# Patient Record
Sex: FEMALE | Race: WHITE | NOT HISPANIC OR LATINO | URBAN - METROPOLITAN AREA
[De-identification: names, ages, dates, MRNs, and addresses within clinical notes are randomized per-mention and may not be internally consistent; named-entity substitution may affect disease eponyms.]

---

## 2022-08-12 ENCOUNTER — ANNUAL EXAM (OUTPATIENT)
Dept: OBGYN CLINIC | Facility: CLINIC | Age: 22
End: 2022-08-12
Payer: COMMERCIAL

## 2022-08-12 VITALS
WEIGHT: 184.2 LBS | HEIGHT: 65 IN | DIASTOLIC BLOOD PRESSURE: 68 MMHG | BODY MASS INDEX: 30.69 KG/M2 | SYSTOLIC BLOOD PRESSURE: 108 MMHG

## 2022-08-12 DIAGNOSIS — Z11.3 SCREEN FOR STD (SEXUALLY TRANSMITTED DISEASE): ICD-10-CM

## 2022-08-12 DIAGNOSIS — E55.9 VITAMIN D DEFICIENCY: ICD-10-CM

## 2022-08-12 DIAGNOSIS — Z01.419 ENCOUNTER FOR GYNECOLOGICAL EXAMINATION WITHOUT ABNORMAL FINDING: Primary | ICD-10-CM

## 2022-08-12 DIAGNOSIS — Z30.09 CONTRACEPTIVE EDUCATION: ICD-10-CM

## 2022-08-12 DIAGNOSIS — R10.2 PELVIC PAIN: ICD-10-CM

## 2022-08-12 PROCEDURE — 0503F POSTPARTUM CARE VISIT: CPT | Performed by: OBSTETRICS & GYNECOLOGY

## 2022-08-12 PROCEDURE — 99395 PREV VISIT EST AGE 18-39: CPT | Performed by: OBSTETRICS & GYNECOLOGY

## 2022-08-12 RX ORDER — OMEPRAZOLE 20 MG/1
CAPSULE, DELAYED RELEASE ORAL
COMMUNITY
Start: 2022-06-16

## 2022-08-12 RX ORDER — MULTIVIT-MIN/IRON/FOLIC ACID/K 18-600-40
CAPSULE ORAL
COMMUNITY

## 2022-08-12 NOTE — PROGRESS NOTES
[address], [address], [place] [address]    ASSESSMENT/PLAN: Monica Prado is a 20 y o  No obstetric history on file  who presents for annual gynecologic exam     Encounter for routine gynecologic examination  - Routine well woman exam completed today  - HPV Vaccination status: Immunization series complete  - STI screening offered including HIV testing: culture done   - Contraceptive counseling discussed  Current contraception: condoms:     Additional problems addressed during this visit:  1  Encounter for gynecological examination without abnormal finding    2  Contraceptive education    3  Pelvic pain  -     US pelvis complete w transvaginal; Future; Expected date: 2022    4  Vitamin D deficiency    5  Screen for STD (sexually transmitted disease)  -     Chlamydia/N  gonorrhoeae RNA, TMA        CC:  Annual Gynecologic Examination    HPI: Monica Prado is a 20 y o  No obstetric history on file  who presents for annual gynecologic examination  19 yo  here for wellness exam   Neg  Cytology in     On Orthotricyclen  By primary for  HMB in past   No surgical hx    Hx of GERD, HMB  Menarche at  14years of age  35-28 d,  5-7 d,  Previously chg   Tampon every 1-2 hours  On   OCP onset age 22  The following portions of the patient's history were reviewed and updated as appropriate: She  has a past medical history of Kidney stone and Papanicolaou smear (2020)  She  has no past surgical history on file  Her family history includes Deep vein thrombosis in her father; Diabetes in her paternal grandmother; Heart disease in her paternal grandmother; Lupus in her father; Pulmonary embolism in her father  She  reports that she has never smoked  She has never used smokeless tobacco  She reports previous alcohol use  She reports that she does not use drugs    Current Outpatient Medications   Medication Sig Dispense Refill    Cholecalciferol (Vitamin D) 50 MCG (2000 UT) CAPS Take by mouth      omeprazole (PriLOSEC) 20 mg delayed release capsule TAKE 1 CAPSULE BY MOUTH EVERY DAY 30 MINUTES BEFORE MORNING MEAL FOR 90 DAYS       No current facility-administered medications for this visit  She is allergic to amoxicillin-pot clavulanate and clindamycin       Review of Systems   Constitutional: Negative for chills and fever  HENT: Negative for ear pain and sore throat  Eyes: Negative for pain and visual disturbance  Respiratory: Negative for cough and shortness of breath  Cardiovascular: Negative for chest pain and palpitations  Gastrointestinal: Negative for abdominal pain and vomiting  Genitourinary: Negative for dysuria and hematuria  Musculoskeletal: Negative for arthralgias and back pain  Skin: Negative for color change and rash  Neurological: Negative for seizures and syncope  All other systems reviewed and are negative  Objective:  /68 (BP Location: Left arm, Patient Position: Sitting, Cuff Size: Standard)   Ht 5' 5" (1 651 m)   Wt 83 6 kg (184 lb 3 2 oz)   LMP 08/06/2022   BMI 30 65 kg/m²    Physical Exam  Vitals and nursing note reviewed  Constitutional:       Appearance: Normal appearance  HENT:      Head: Normocephalic  Cardiovascular:      Rate and Rhythm: Normal rate and regular rhythm  Pulses: Normal pulses  Heart sounds: Normal heart sounds  Pulmonary:      Effort: Pulmonary effort is normal       Breath sounds: Normal breath sounds  Chest:      Chest wall: No mass, lacerations, swelling, tenderness or edema  Breasts: Keith Score is 4  Breasts are symmetrical       Right: Normal  No swelling, bleeding, inverted nipple, mass, nipple discharge, skin change, tenderness, axillary adenopathy or supraclavicular adenopathy  Left: No swelling, bleeding, inverted nipple, mass, nipple discharge, skin change, tenderness, axillary adenopathy or supraclavicular adenopathy  Abdominal:      General: Abdomen is flat  Bowel sounds are normal       Palpations: Abdomen is soft  Genitourinary:     General: Normal vulva  Exam position: Lithotomy position  Pubic Area: No rash  Keith stage (genital): 4       Labia:         Right: No rash, tenderness or lesion  Left: No rash, tenderness or lesion  Urethra: No urethral pain, urethral swelling or urethral lesion  Vagina: Normal       Cervix: No cervical motion tenderness or discharge  Uterus: Normal        Adnexa: Right adnexa normal and left adnexa normal       Rectum: Normal    Musculoskeletal:         General: Normal range of motion  Cervical back: Neck supple  Lymphadenopathy:      Upper Body:      Right upper body: No supraclavicular, axillary or pectoral adenopathy  Left upper body: No supraclavicular, axillary or pectoral adenopathy  Lower Body: No right inguinal adenopathy  No left inguinal adenopathy  Skin:     General: Skin is warm and dry  Neurological:      General: No focal deficit present  Mental Status: She is alert and oriented to person, place, and time     Psychiatric:         Mood and Affect: Mood normal          Behavior: Behavior normal

## 2022-08-12 NOTE — PATIENT INSTRUCTIONS
Pap every 3 years if normal, STI testing as indicated, exercise most days of week, obtain appropriate diet and hydration, Calcium 1000mg + 600 vit D daily, birth control as directed (ACHES reviewed)  Benefits, risks and alternatives discussed/reviewed  Condom use when sexually active for sexually transmitted infection prevention  HPV 9 vaccine recommended through age 39  Check with your insurance for coverage  If covered, call office to schedule start of vaccine series  Annual mammogram starting at age 36, monthly breast self exam  Terrell Rosales   at red light or at least  20 times a day

## 2022-08-13 LAB
C TRACH RRNA SPEC QL NAA+PROBE: NOT DETECTED
N GONORRHOEA RRNA SPEC QL NAA+PROBE: NOT DETECTED

## 2024-05-03 ENCOUNTER — ANNUAL EXAM (OUTPATIENT)
Dept: OBGYN CLINIC | Facility: CLINIC | Age: 24
End: 2024-05-03
Payer: COMMERCIAL

## 2024-05-03 VITALS
DIASTOLIC BLOOD PRESSURE: 72 MMHG | SYSTOLIC BLOOD PRESSURE: 114 MMHG | HEIGHT: 65 IN | WEIGHT: 192 LBS | BODY MASS INDEX: 31.99 KG/M2

## 2024-05-03 DIAGNOSIS — E03.9 HYPOTHYROIDISM, UNSPECIFIED TYPE: ICD-10-CM

## 2024-05-03 DIAGNOSIS — N62 BREAST HYPERTROPHY IN FEMALE: ICD-10-CM

## 2024-05-03 DIAGNOSIS — Z30.09 CONTRACEPTIVE EDUCATION: ICD-10-CM

## 2024-05-03 DIAGNOSIS — Z01.411 ENCOUNTER FOR GYNECOLOGICAL EXAMINATION WITH ABNORMAL FINDING: Primary | ICD-10-CM

## 2024-05-03 DIAGNOSIS — Z12.4 SCREENING FOR CERVICAL CANCER: ICD-10-CM

## 2024-05-03 DIAGNOSIS — N94.6 DYSMENORRHEA: ICD-10-CM

## 2024-05-03 PROCEDURE — 99395 PREV VISIT EST AGE 18-39: CPT | Performed by: OBSTETRICS & GYNECOLOGY

## 2024-05-03 RX ORDER — SPIRONOLACTONE 25 MG/1
25 TABLET ORAL DAILY
COMMUNITY

## 2024-05-03 RX ORDER — DROSPIRENONE AND ETHINYL ESTRADIOL 0.02-3(28)
1 KIT ORAL DAILY
Qty: 90 TABLET | Refills: 4 | Status: SHIPPED | OUTPATIENT
Start: 2024-05-03

## 2024-05-03 RX ORDER — SPIRONOLACTONE 50 MG/1
50 TABLET, FILM COATED ORAL DAILY
COMMUNITY

## 2024-05-03 NOTE — PROGRESS NOTES
[facility] [address], [address], [place] [address]    ASSESSMENT/PLAN: Serenity Rodriguez is a 24 y.o.  who presents for annual gynecologic exam.    Encounter for routine gynecologic examination  - Routine well woman exam completed today.  - HPV Vaccination status: Immunization series complete  - STI screening offered including HIV testing: Declined  - Contraceptive counseling discussed.  Current contraception: condoms or combination OCPs:     Additional problems addressed during this visit:  1. Encounter for gynecological examination with abnormal finding    2. Screening for cervical cancer  -     Thinprep Tis Pap Reflex HPV mRNA E6/E7    3. Contraceptive education  Comments:  CONDOMS, START PILL DAY ONE OF MENSES ONE TABLET DAILY  Orders:  -     drospirenone-ethinyl estradiol (MATTHEW) 3-0.02 MG per tablet; Take 1 tablet by mouth daily    4. Breast hypertrophy in female  Comments:  referral to gwendolyn mckay    neck pain , back pain , dovots,   rashes.   has to  wear double  bra to exercise. 38 DD    5. Hypothyroidism, unspecified type  Comments:  HAS CONSULT  2025    6. Dysmenorrhea  Comments:  MOTIRN 600 MG EVERY 6  HOURS WHILE AWAKE  Orders:  -     drospirenone-ethinyl estradiol (MATTHEW) 3-0.02 MG per tablet; Take 1 tablet by mouth daily        CC:  Annual Gynecologic Examination    HPI: Serenity Rodriguez is a 24 y.o.  who presents for annual gynecologic examination.  25 yo  here for wellness exam.  Not currently sexually active.  Cycles every 28 to 30 days heavy with a lot of cramping back pain PMS type symptoms.  Positive Gardasil.  Last menstrual period 2024.  Positive recent dry skin, weight gain, fatigue.  Diagnosed with hypothyroidism awaiting endocrine consult.  Positive breast hypertrophy.  38 double D bra size.  Patient reports that she has neck pain, back pain, rashes under her breasts, and difficulty finding sports where has to wear 2 bras to exercise.   Discussed with patient breast reduction referral to Devonte Salguero.  Patient has used nonsteroidals for her pain.  Positive condom use if sexually active.      The following portions of the patient's history were reviewed and updated as appropriate: She  has a past medical history of Hashimoto's disease, Kidney stone, and Papanicolaou smear (09/23/2020).  She  has no past surgical history on file.  Her family history includes Deep vein thrombosis in her father; Diabetes in her maternal grandmother and paternal grandmother; Heart disease in her maternal grandmother and paternal grandmother; Lupus in her father; Pulmonary embolism in her father.  She  reports that she has never smoked. She has never used smokeless tobacco. She reports that she does not currently use alcohol. She reports that she does not use drugs.  Current Outpatient Medications   Medication Sig Dispense Refill   • Cholecalciferol (Vitamin D) 50 MCG (2000 UT) CAPS Take by mouth     • drospirenone-ethinyl estradiol (MATTHEW) 3-0.02 MG per tablet Take 1 tablet by mouth daily 90 tablet 4   • omeprazole (PriLOSEC) 20 mg delayed release capsule TAKE 1 CAPSULE BY MOUTH EVERY DAY 30 MINUTES BEFORE MORNING MEAL FOR 90 DAYS     • spironolactone (ALDACTONE) 25 mg tablet Take 25 mg by mouth daily     • spironolactone (ALDACTONE) 50 mg tablet Take 50 mg by mouth daily       No current facility-administered medications for this visit.     She is allergic to amoxicillin-pot clavulanate and clindamycin..    Review of Systems   Constitutional:  Positive for activity change, fatigue and unexpected weight change. Negative for chills and fever.   HENT:  Negative for ear pain and sore throat.    Eyes:  Negative for pain and visual disturbance.   Respiratory:  Negative for cough and shortness of breath.    Cardiovascular:  Negative for chest pain and palpitations.   Gastrointestinal:  Negative for abdominal distention, abdominal pain, constipation and vomiting.   Endocrine:  "Negative.         Wt  gain, dry skin  hmb    Genitourinary:  Negative for dysuria, hematuria, vaginal bleeding and vaginal discharge.   Musculoskeletal:  Negative for arthralgias and back pain.   Skin:  Negative for color change and rash.   Allergic/Immunologic: Negative.    Neurological: Negative.  Negative for seizures and syncope.   Hematological: Negative.    Psychiatric/Behavioral: Negative.     All other systems reviewed and are negative.        Objective:  /72 (BP Location: Right arm, Patient Position: Sitting, Cuff Size: Standard)   Ht 5' 5\" (1.651 m)   Wt 87.1 kg (192 lb)   LMP 04/20/2024   BMI 31.95 kg/m²    Physical Exam  Vitals and nursing note reviewed.   Constitutional:       Appearance: Normal appearance.   HENT:      Head: Normocephalic.   Cardiovascular:      Rate and Rhythm: Normal rate and regular rhythm.      Pulses: Normal pulses.      Heart sounds: Normal heart sounds.   Pulmonary:      Effort: Pulmonary effort is normal.      Breath sounds: Normal breath sounds.   Chest:      Chest wall: No mass, lacerations, swelling, tenderness or edema.   Breasts:     Keith Score is 4.      Breasts are symmetrical.      Right: No swelling, bleeding, inverted nipple, mass, nipple discharge, skin change or tenderness.      Left: No swelling, bleeding, inverted nipple, mass, nipple discharge, skin change or tenderness.          Comments: 1.  PENDULAR  SYMMETRICAL  NO MASSES       Abdominal:      General: Abdomen is flat. Bowel sounds are normal.      Palpations: Abdomen is soft.   Genitourinary:     General: Normal vulva.      Exam position: Lithotomy position.      Pubic Area: No rash.       Keith stage (genital): 4.      Labia:         Right: No rash, tenderness or lesion.         Left: No rash, tenderness or lesion.       Urethra: No urethral pain, urethral swelling or urethral lesion.      Vagina: Normal.      Cervix: No cervical motion tenderness or discharge.      Uterus: Normal.       " Adnexa: Right adnexa normal and left adnexa normal.      Rectum: Normal.   Musculoskeletal:         General: Normal range of motion.      Cervical back: Normal range of motion and neck supple.   Lymphadenopathy:      Upper Body:      Right upper body: No supraclavicular, axillary or pectoral adenopathy.      Left upper body: No supraclavicular, axillary or pectoral adenopathy.      Lower Body: No right inguinal adenopathy. No left inguinal adenopathy.   Skin:     General: Skin is warm and dry.   Neurological:      General: No focal deficit present.      Mental Status: She is alert and oriented to person, place, and time.   Psychiatric:         Mood and Affect: Mood normal.         Behavior: Behavior normal.         Thought Content: Thought content normal.         Judgment: Judgment normal.

## 2024-05-08 LAB
CLINICAL INFO: NORMAL
CYTO CVX: NORMAL
CYTOLOGY CMNT CVX/VAG CYTO-IMP: NORMAL
DATE PREVIOUS BX: NORMAL
LMP START DATE: NORMAL
SL AMB PREV. PAP:: NORMAL
SPECIMEN SOURCE CVX/VAG CYTO: NORMAL

## 2024-06-02 PROBLEM — Z12.4 SCREENING FOR CERVICAL CANCER: Status: RESOLVED | Noted: 2024-05-03 | Resolved: 2024-06-02

## 2024-08-04 ENCOUNTER — HOSPITAL ENCOUNTER (EMERGENCY)
Dept: HOSPITAL 99 - EMR | Age: 24
Discharge: HOME | End: 2024-08-04
Payer: COMMERCIAL

## 2024-08-04 VITALS — DIASTOLIC BLOOD PRESSURE: 88 MMHG | RESPIRATION RATE: 16 BRPM | SYSTOLIC BLOOD PRESSURE: 125 MMHG

## 2024-08-04 VITALS — RESPIRATION RATE: 16 BRPM | SYSTOLIC BLOOD PRESSURE: 156 MMHG | DIASTOLIC BLOOD PRESSURE: 98 MMHG

## 2024-08-04 VITALS — BODY MASS INDEX: 31.7 KG/M2

## 2024-08-04 DIAGNOSIS — M54.16: Primary | ICD-10-CM

## 2024-08-04 DIAGNOSIS — M62.838: ICD-10-CM

## 2024-08-04 PROCEDURE — 99284 EMERGENCY DEPT VISIT MOD MDM: CPT

## 2024-08-04 PROCEDURE — 96372 THER/PROPH/DIAG INJ SC/IM: CPT

## 2024-08-04 RX ADMIN — DIAZEPAM 5 MG: 5 INJECTION INTRAMUSCULAR; INTRAVENOUS at 18:36

## 2024-08-04 RX ADMIN — ACETAMINOPHEN 1000 MG: 500 TABLET ORAL at 18:35

## 2024-08-04 RX ADMIN — OXYCODONE HYDROCHLORIDE 5 MG: 5 TABLET ORAL at 20:14

## 2024-08-04 RX ADMIN — PREDNISONE 50 MG: 50 TABLET ORAL at 21:22

## 2024-08-06 ENCOUNTER — HOSPITAL ENCOUNTER (OUTPATIENT)
Dept: HOSPITAL 99 - CLAB | Age: 24
End: 2024-08-06
Payer: COMMERCIAL

## 2024-08-06 DIAGNOSIS — N62: Primary | ICD-10-CM

## 2024-08-06 PROCEDURE — 88305 TISSUE EXAM BY PATHOLOGIST: CPT

## 2024-08-12 ENCOUNTER — OFFICE VISIT (OUTPATIENT)
Dept: OBGYN CLINIC | Facility: CLINIC | Age: 24
End: 2024-08-12
Payer: COMMERCIAL

## 2024-08-12 VITALS
BODY MASS INDEX: 30.89 KG/M2 | SYSTOLIC BLOOD PRESSURE: 112 MMHG | HEIGHT: 65 IN | WEIGHT: 185.4 LBS | DIASTOLIC BLOOD PRESSURE: 74 MMHG

## 2024-08-12 DIAGNOSIS — Z30.09 CONTRACEPTIVE EDUCATION: Primary | ICD-10-CM

## 2024-08-12 DIAGNOSIS — Z30.41 SURVEILLANCE FOR BIRTH CONTROL, ORAL CONTRACEPTIVES: ICD-10-CM

## 2024-08-12 PROCEDURE — 99213 OFFICE O/P EST LOW 20 MIN: CPT | Performed by: OBSTETRICS & GYNECOLOGY

## 2024-08-12 RX ORDER — LEVOTHYROXINE SODIUM 125 UG/1
125 TABLET ORAL DAILY
COMMUNITY
Start: 2024-07-22

## 2024-08-12 NOTE — PATIENT INSTRUCTIONS
Take birth control as directed.   [place]  BTB  days  as explained.   Rx sent to pharmacy on file.  ACHES reviewed.   Aware of benefits, risks and alternatives of birth control.   Exercise 150 minutes per week minimum.   Always condom use for STI protection.

## 2024-08-12 NOTE — PROGRESS NOTES
PROBLEM GYNECOLOGICAL VISIT    Serenity Rodriguez is a 24 y.o. female who presents today fu from starting  Matthew.  Her general medical history has been reviewed and she reports it as follows:    Past Medical History:   Diagnosis Date   • Hashimoto's disease     Hypothyroid   • Kidney stone    • Papanicolaou smear 2020    Normal Pap     Past Surgical History:   Procedure Laterality Date   • COMBINED REDUCTION MAMMAPLASTY W/ LIPOSUCTION Bilateral      OB History        0    Para   0    Term   0       0    AB   0    Living   0       SAB   0    IAB   0    Ectopic   0    Multiple   0    Live Births   0           Obstetric Comments   13           Social History     Tobacco Use   • Smoking status: Never   • Smokeless tobacco: Never   • Tobacco comments:     Never used.   Vaping Use   • Vaping status: Never Used   Substance Use Topics   • Alcohol use: Not Currently     Comment: Rarely drink. Sometimes socially.   • Drug use: Never       Current Outpatient Medications   Medication Instructions   • Cholecalciferol (Vitamin D) 50 MCG (2000) CAPS Oral   • drospirenone-ethinyl estradiol (MATTHEW) 3-0.02 MG per tablet 1 tablet, Oral, Daily   • levothyroxine 125 mcg, Oral, Daily   • omeprazole (PriLOSEC) 20 mg delayed release capsule TAKE 1 CAPSULE BY MOUTH EVERY DAY 30 MINUTES BEFORE MORNING MEAL FOR 90 DAYS   • spironolactone (ALDACTONE) 50 mg, Oral, Daily       History of Present Illness:   Pt  w hx of acne   on  ocp   x 4 packs some  btb.  + started on levothyroxine   wt 7 lb wt loss .  Not sexually active.   + b/l breast reduction  last week     Review of Systems:  Review of Systems   Constitutional: Negative.  Negative for activity change, appetite change and unexpected weight change.   Gastrointestinal: Negative.    Endocrine: Negative.    Genitourinary: Negative.         Imb pack 2   Allergic/Immunologic: Negative.    Neurological: Negative.    Hematological: Negative.    Psychiatric/Behavioral: Negative.  "        Physical Exam:  /74   Ht 5' 5\" (1.651 m)   Wt 84.1 kg (185 lb 6.4 oz)   LMP 07/26/2024   BMI 30.85 kg/m²   Physical Exam  Constitutional:       Appearance: Normal appearance.   Neurological:      Mental Status: She is alert.   Psychiatric:         Mood and Affect: Mood normal.         Behavior: Behavior normal.         Thought Content: Thought content normal.         Judgment: Judgment normal.   Vitals and nursing note reviewed.           Assessment:   1. Contraceptive  fu      Plan:   Continue  Carin one po every day.  Reviewed ACHES and BTB.  If IMB stop the pill for  4 days and   restart.    [place]  btb days   Condoms.  I have spent a total time of 20 minutes in caring for this patient on the day of the visit/encounter including Diagnostic results, Prognosis, Risks and benefits of tx options, Instructions for management, Risk factor reductions, Counseling / Coordination of care, Documenting in the medical record, Reviewing / ordering tests, medicine, procedures  , and Obtaining or reviewing history  .     Reviewed with patient that [facility], but that they will not necessarily be reviewed by me immediately.  Explained that I will review results at my earliest opportunity and contact patient appropriately.  "

## 2025-07-27 PROBLEM — Z30.41 SURVEILLANCE FOR BIRTH CONTROL, ORAL CONTRACEPTIVES: Status: ACTIVE | Noted: 2025-07-27

## 2025-07-27 PROBLEM — Z01.419 ENCOUNTER FOR GYNECOLOGICAL EXAMINATION WITHOUT ABNORMAL FINDING: Status: ACTIVE | Noted: 2025-07-27

## 2025-07-28 ENCOUNTER — ANNUAL EXAM (OUTPATIENT)
Dept: OBGYN CLINIC | Facility: CLINIC | Age: 25
End: 2025-07-28
Payer: COMMERCIAL

## 2025-07-28 VITALS
SYSTOLIC BLOOD PRESSURE: 98 MMHG | BODY MASS INDEX: 33.49 KG/M2 | WEIGHT: 201 LBS | DIASTOLIC BLOOD PRESSURE: 64 MMHG | HEIGHT: 65 IN

## 2025-07-28 DIAGNOSIS — N94.6 DYSMENORRHEA: ICD-10-CM

## 2025-07-28 DIAGNOSIS — Z98.890 H/O BILATERAL BREAST REDUCTION SURGERY: ICD-10-CM

## 2025-07-28 DIAGNOSIS — Z30.41 SURVEILLANCE FOR BIRTH CONTROL, ORAL CONTRACEPTIVES: ICD-10-CM

## 2025-07-28 DIAGNOSIS — Z01.419 ENCOUNTER FOR GYNECOLOGICAL EXAMINATION WITHOUT ABNORMAL FINDING: Primary | ICD-10-CM

## 2025-07-28 DIAGNOSIS — Z11.3 SCREEN FOR STD (SEXUALLY TRANSMITTED DISEASE): ICD-10-CM

## 2025-07-28 PROCEDURE — S0612 ANNUAL GYNECOLOGICAL EXAMINA: HCPCS | Performed by: OBSTETRICS & GYNECOLOGY

## 2025-07-28 RX ORDER — TRETINOIN 0.25 MG/G
1 CREAM TOPICAL EVERY 24 HOURS
COMMUNITY

## 2025-07-28 RX ORDER — CLINDAMYCIN PHOSPHATE 10 MG/G
GEL TOPICAL
COMMUNITY

## 2025-07-28 RX ORDER — OXYMETAZOLINE HYDROCHLORIDE 30 G/1
1 CREAM TOPICAL EVERY 24 HOURS
COMMUNITY

## 2025-07-28 RX ORDER — DIPHENOXYLATE HYDROCHLORIDE AND ATROPINE SULFATE 2.5; .025 MG/1; MG/1
1 TABLET ORAL DAILY
COMMUNITY
End: 2025-07-28

## 2025-07-28 RX ORDER — NORETHINDRONE ACETATE AND ETHINYL ESTRADIOL 1MG-20(21)
1 KIT ORAL DAILY
Qty: 90 TABLET | Refills: 3 | Status: SHIPPED | OUTPATIENT
Start: 2025-07-28

## 2025-07-29 LAB
C TRACH RRNA SPEC QL NAA+PROBE: NOT DETECTED
N GONORRHOEA RRNA SPEC QL NAA+PROBE: NOT DETECTED